# Patient Record
Sex: FEMALE | Race: OTHER | HISPANIC OR LATINO
[De-identification: names, ages, dates, MRNs, and addresses within clinical notes are randomized per-mention and may not be internally consistent; named-entity substitution may affect disease eponyms.]

---

## 2021-02-24 ENCOUNTER — APPOINTMENT (OUTPATIENT)
Dept: GASTROENTEROLOGY | Facility: CLINIC | Age: 41
End: 2021-02-24

## 2021-02-24 ENCOUNTER — NON-APPOINTMENT (OUTPATIENT)
Age: 41
End: 2021-02-24

## 2021-02-24 PROBLEM — Z00.00 ENCOUNTER FOR PREVENTIVE HEALTH EXAMINATION: Status: ACTIVE | Noted: 2021-02-24

## 2021-05-26 NOTE — HISTORY OF PRESENT ILLNESS
[de-identified] : Last evaluated in 2018 for r occasional lip tingling /  episodes of epigastric burning / increased salivation and nausea / retching.  EGD  8/2018 revealed mild  gastritis\par \par Evaluated  in 2017 on referral from ED  for ER physician. Apparently in August 2017 for  palpitations / chest pain / numbness in arms and legs bilaterally as well as tingling in lips  that developed while in [place]  ( occurred during a fish meal ).  Hospitalized for 6 days. Negative w/u except for ? ECG abnormality. On return to [facility]...sxs  persisted prompting 5 ER visits. Cardiology evaluation at [facility] reportedly unremarkable. Cardiac MRI reportedly normal. ID evaluation reportedly unremarkable. Neurology evaluation at [facility] was unremarkable by report.

## 2021-05-26 NOTE — PHYSICAL EXAM
[General Appearance - Alert] : alert [General Appearance - In No Acute Distress] : in no acute distress [Sclera] : the sclera and conjunctiva were normal [Outer Ear] : the ears and nose were normal in appearance [Neck Appearance] : the appearance of the neck was normal [] : no respiratory distress [Bowel Sounds] : normal bowel sounds [Abnormal Walk] : normal gait [Skin Color & Pigmentation] : normal skin color and pigmentation [No Focal Deficits] : no focal deficits [Oriented To Time, Place, And Person] : oriented to person, place, and time

## 2021-05-27 ENCOUNTER — APPOINTMENT (OUTPATIENT)
Dept: GASTROENTEROLOGY | Facility: CLINIC | Age: 41
End: 2021-05-27

## 2022-04-15 ENCOUNTER — APPOINTMENT (OUTPATIENT)
Dept: GASTROENTEROLOGY | Facility: CLINIC | Age: 42
End: 2022-04-15
Payer: COMMERCIAL

## 2022-04-15 VITALS
OXYGEN SATURATION: 98 % | TEMPERATURE: 97.2 F | HEIGHT: 60 IN | HEART RATE: 89 BPM | WEIGHT: 105 LBS | DIASTOLIC BLOOD PRESSURE: 72 MMHG | SYSTOLIC BLOOD PRESSURE: 98 MMHG | BODY MASS INDEX: 20.62 KG/M2

## 2022-04-15 PROCEDURE — 99203 OFFICE O/P NEW LOW 30 MIN: CPT

## 2022-04-15 RX ORDER — PANTOPRAZOLE 20 MG/1
20 TABLET, DELAYED RELEASE ORAL TWICE DAILY
Qty: 60 | Refills: 3 | Status: ACTIVE | COMMUNITY
Start: 2022-04-15 | End: 1900-01-01

## 2022-04-15 NOTE — ASSESSMENT
[FreeTextEntry1] : 1. GERD\par - Restart pantoprazole 20mg BID\par - Avoid or limit foods that cause symptoms, which may be excessive caffeine, chocolate, alcohol, peppermint, red sauce, spicy foods and fatty foods.  \par - Avoid late night eating and snacking\par -Use OTC antacids/alginates as needed such as Tums, Maalox or Gaviscon Advanced\par - RTO in 3-4 weeks. \par \par 2. Blood on stool\par - Guaiac negative. \par - If episode occurs again will call office, may rec screening colon early.

## 2022-04-15 NOTE — HISTORY OF PRESENT ILLNESS
[FreeTextEntry1] : Ms. CHARLIE KU is a 41 year female with a PMH of GERD.\par \par Patient having severe heartburn to the extent of needing to vomit.\par Occurs after eating.\par She is eating a bland diet - sweet potato, banana, oatmeal, but has had minimal effect.\par She had an EGD with Dr. Mcneill in 2018 for similar symptoms- showed esophageal inflammation. \par Started on PPI trial in the time. \par She had a streak of blood on her stool twice in the past week. \par History of hemorrhoids, but this feels different. \par She has not taken anything for her heartburn. \par Denies change in bowel habits, typically constipated.\par She had a cholecystectomy 2003 -- depending on types of foods she eats she will have diarrhea. \par Denies family  history of colon cancer or IBD. \par Denies hematochezia, unintended weight loss, abdominal pain, fever.\par \par \par Physical Exam: (DESI Taveras present for the duration of the exam)\par Constitutional: NAD. Well appearing.\par Eyes: Sclera are normal, anicteric. \par Abdomen: Soft, nontender. No distention, masses, hepatosplenomegaly. \par Rectal: Normal sphincter tone. No palpable masses, lesions or fissures. Guaiac negative. \par Skin: Normal color and pigmentation. No rashes. No pallor or jaundice. No palmar erythema. \par Neurological: AAOx3.\par

## 2022-04-27 ENCOUNTER — APPOINTMENT (OUTPATIENT)
Dept: GASTROENTEROLOGY | Facility: CLINIC | Age: 42
End: 2022-04-27
Payer: COMMERCIAL

## 2022-04-27 VITALS
SYSTOLIC BLOOD PRESSURE: 94 MMHG | DIASTOLIC BLOOD PRESSURE: 70 MMHG | HEIGHT: 60 IN | OXYGEN SATURATION: 98 % | TEMPERATURE: 99.3 F | BODY MASS INDEX: 20.22 KG/M2 | HEART RATE: 83 BPM | WEIGHT: 103 LBS

## 2022-04-27 PROCEDURE — 99214 OFFICE O/P EST MOD 30 MIN: CPT

## 2022-04-27 NOTE — ASSESSMENT
[FreeTextEntry1] : - GERD - Reviewed dietary and lifestyle modifications, including weight loss, smaller/frequent/earlier (>3h prior to lying down) meals, trials of cutting down/out caffeine, chocolate, tomatoes, fatty foods, alcohol.\par If abdominal pain not resolved completely by time of colonoscopy, will do concurrent EGD.\par \par -rectal bleeding - suspect hemorrhoidal, but w/ neg VA, will plan colonoscopy to r/o other etiologies.\par \par PMD/consultation/hospital notes and Labs/imaging/prior endoscopic results reviewed to extent noted in HPI; and, if procedure code billed on this visit for lab draw, this serves to signify that labs were drawn here in this office.\par

## 2022-04-27 NOTE — HISTORY OF PRESENT ILLNESS
[FreeTextEntry1] : 41f hx choly - seen recently by DALE Cavanaugh w/ heartburn postprandially - with similar sx eval w/ Dr. Mcneill/EGD w/ mild esophagitis - ppi restarted, and heartburn seems to be improving. She does report "attacks" of epigastric pain as well postprandially - no pain now - this is improving slowly on ppi.  No melena, dysphagia.  \par \par She saw BRBPR a few wks ago and again yesterday.  No anal pain.  No diarrhea, melena.\par \par Soc:  no tobacco or significant EtOH\par FHx: no FHx GI malignancy or IBD\par \par ROS:\par Constitutional:: no weight loss, fevers\par ENT: no deafness\par Eyes: not blind\par Neck: no LN\par Chest: no dyspnea/cough\par Cardiac: no chest pain\par Vascular: no leg swelling\par GI: no abdominal pain, nausea, vomiting, diarrhea, constipation, rectal bleeding, dysphagia, melena unless otherwise noted in HPI\par : no dysuria, dark urine\par Skin: no rashes, jaundice\par Heme: no bleeding\par Endocrine: no DM unless otherwise stated in HPI\par \par Px: (VS noted below)\par General: NAD\par Eyes: anicteric\par Oropharynx:  clear\par Neck: no LN\par Chest: normal respiratory effort\par CVS: regular\par Abd: soft, NT, ND, +BS, no HSM\par Ext: no atrophy\par Neuro: grossly nonfoca\par VA was normal last wk w/ NP\par \par Labs/imaging/prior endoscopic results reviewed to the extent available and noted in HPI\par

## 2022-04-27 NOTE — CONSULT LETTER
[FreeTextEntry1] : Dear Dr. SAUL TREVIÑO ,\par \par I had the pleasure of evaluating your patient,  CHARLIE KU.\par \par Please refer to my note below.\par \par Thank you very much for allowing me to participate in the care of this patient.  If you have any questions, please do not hesitate to contact me.\par \par Sincerely, \par \par Tha Gao MD\par

## 2022-05-11 ENCOUNTER — APPOINTMENT (OUTPATIENT)
Dept: GASTROENTEROLOGY | Facility: CLINIC | Age: 42
End: 2022-05-11

## 2022-05-12 ENCOUNTER — APPOINTMENT (OUTPATIENT)
Dept: GASTROENTEROLOGY | Facility: HOSPITAL | Age: 42
End: 2022-05-12

## 2022-05-23 PROBLEM — Z78.9 CURRENT NON-SMOKER: Status: ACTIVE | Noted: 2022-05-23

## 2022-05-25 ENCOUNTER — APPOINTMENT (OUTPATIENT)
Dept: GASTROENTEROLOGY | Facility: CLINIC | Age: 42
End: 2022-05-25
Payer: COMMERCIAL

## 2022-05-25 VITALS
WEIGHT: 103 LBS | OXYGEN SATURATION: 99 % | TEMPERATURE: 98.6 F | DIASTOLIC BLOOD PRESSURE: 80 MMHG | SYSTOLIC BLOOD PRESSURE: 119 MMHG | HEIGHT: 60 IN | HEART RATE: 85 BPM | BODY MASS INDEX: 20.22 KG/M2

## 2022-05-25 DIAGNOSIS — K21.9 GASTRO-ESOPHAGEAL REFLUX DISEASE W/OUT ESOPHAGITIS: ICD-10-CM

## 2022-05-25 DIAGNOSIS — K62.5 HEMORRHAGE OF ANUS AND RECTUM: ICD-10-CM

## 2022-05-25 DIAGNOSIS — Z78.9 OTHER SPECIFIED HEALTH STATUS: ICD-10-CM

## 2022-05-25 PROCEDURE — 99214 OFFICE O/P EST MOD 30 MIN: CPT

## 2022-05-25 NOTE — ASSESSMENT
[FreeTextEntry1] : 1. GERD:  Finish pantoprazole. Dietary and  lifestyle  modification. EGD scheduled. Has bloating with pantoprazole, may try Pepcid  when finished with pantoprazole\par \par 2. BRBPR:  Likely hemorrhoids. Colonoscopy planned to confirm\par \par Risks of the procedures including but not limited to bleeding / perforation / infection / anesthesia complication / missed  lesions explained to the  patient . The patient expressed understanding and a desire to proceed with the procedures.\par \par Risk of not doing procedures includes but is not limited to missed or delayed diagnosis of gastric pathology, colon cancer or other gastrointestinal pathology\par \par Pertinent available records reviewed\par

## 2022-05-25 NOTE — PHYSICAL EXAM
[General Appearance - Alert] : alert [General Appearance - In No Acute Distress] : in no acute distress [Sclera] : the sclera and conjunctiva were normal [Outer Ear] : the ears and nose were normal in appearance [Neck Appearance] : the appearance of the neck was normal [] : no respiratory distress [Abdomen Soft] : soft [FreeTextEntry1] : Deferred pending colonoscopy [No CVA Tenderness] : no ~M costovertebral angle tenderness [Abnormal Walk] : normal gait [Skin Color & Pigmentation] : normal skin color and pigmentation [Oriented To Time, Place, And Person] : oriented to person, place, and time

## 2022-05-25 NOTE — HISTORY OF PRESENT ILLNESS
[de-identified] : Patient  was recently seen by Dr. Gao ( 4/27/22) and  DALE Cavanaugh ( 4/15/2022) .  Dr. Gao had scheduled a  colonoscopy and  EGD secondary to GERD and  BRBPR. Prior to seeing  Dr. Murray Cavanaugh NP restarted PPI with significant  improved in reflux ( with occasional emesis).  BRBPR is  intermittent and characterized as brown stool with red streaks\par \par -EGD 8/2018: mild  gastritis

## 2022-07-05 ENCOUNTER — APPOINTMENT (OUTPATIENT)
Dept: GASTROENTEROLOGY | Facility: HOSPITAL | Age: 42
End: 2022-07-05